# Patient Record
Sex: MALE | Race: WHITE | NOT HISPANIC OR LATINO
[De-identification: names, ages, dates, MRNs, and addresses within clinical notes are randomized per-mention and may not be internally consistent; named-entity substitution may affect disease eponyms.]

---

## 2020-02-04 ENCOUNTER — FORM ENCOUNTER (OUTPATIENT)
Age: 75
End: 2020-02-04

## 2020-02-05 ENCOUNTER — OUTPATIENT (OUTPATIENT)
Dept: OUTPATIENT SERVICES | Facility: HOSPITAL | Age: 75
LOS: 1 days | End: 2020-02-05
Payer: MEDICARE

## 2020-02-05 ENCOUNTER — APPOINTMENT (OUTPATIENT)
Dept: ORTHOPEDIC SURGERY | Facility: CLINIC | Age: 75
End: 2020-02-05
Payer: MEDICARE

## 2020-02-05 VITALS
BODY MASS INDEX: 35.79 KG/M2 | OXYGEN SATURATION: 97 % | HEART RATE: 92 BPM | WEIGHT: 250 LBS | HEIGHT: 70 IN | DIASTOLIC BLOOD PRESSURE: 80 MMHG | SYSTOLIC BLOOD PRESSURE: 180 MMHG

## 2020-02-05 PROBLEM — Z00.00 ENCOUNTER FOR PREVENTIVE HEALTH EXAMINATION: Status: ACTIVE | Noted: 2020-02-05

## 2020-02-05 PROCEDURE — 99203 OFFICE O/P NEW LOW 30 MIN: CPT

## 2020-02-05 PROCEDURE — 73562 X-RAY EXAM OF KNEE 3: CPT

## 2020-02-05 PROCEDURE — 73562 X-RAY EXAM OF KNEE 3: CPT | Mod: 26,50

## 2020-02-07 NOTE — ASSESSMENT
[FreeTextEntry1] : Assessment/Plan\par \par Bilateral knee OA\par \par The left knee was injected as described above, today they will rest ice and use Tylenol as needed for pain. \par \par Will demonstrate at home strengthening exercises\par \par Will prescribe Meloxicam \par \par F/U  in 3 months\par \par All medical record entries made by the PA/Scribe/Fellow are at my, Dr. Bashir Shaw's direction and personally dictated by me on 02/05/2020]. I have reviewed the chart and agree that the record accurately reflects my personal performance of the history, physical exam, assessment, and plan. I have also personally directed reviewed, and agreed with the chart.\par \par \par \par

## 2020-02-07 NOTE — REVIEW OF SYSTEMS
[Joint Pain] : joint pain [Negative] : Heme/Lymph [Arthralgia] : no arthralgia [Joint Stiffness] : no joint stiffness [Joint Swelling] : no joint swelling

## 2020-02-07 NOTE — HISTORY OF PRESENT ILLNESS
[de-identified] : Guanaco is a 75 year old male here for initial visit of bilateral knee pain (R>L). He complains of sharp, intermittent pain, rated 9/10. Patient is unable to walk more than half a block without taking a break. Pain is better at rest and worse during ambulation. He had an operation in the left knee for a meniscus tear 15 years ago. Patient never had knee injections and is currently take Aleve for pain management.

## 2020-02-07 NOTE — PROCEDURE
[de-identified] : After obtaining verbal consent and under normal sterile conditions the left knee joint was injected with 4 cc of lidocaine and 1 cc of 40 mg per mL of Kenalog.\par \par \par

## 2020-02-07 NOTE — PHYSICAL EXAM
[de-identified] : General: Not in acute distress, dressed appropriately, sitting on examination table\par Skin: Warm and dry, normal turgor, no rashes\par Neurological: AOx3, Cranial nerves grossly in tact\par Psych: Mood and affect appropriate\par \par Right Knee: Alignment: Neutral Non-Tender: ROM: 0-120 5/5 Strength. DNVI. Ambulates with no assisted crutches. +2 petting edema.\par \par Left Knee: Alignment: Neutral Tender- Medial and Joint line tenderness: ROM: 0-120 5/5 Strength. DNVI. Ambulates with no assisted crutches. \par +2 petting edema.\par \par \par \par \par  [de-identified] : X-ray of the left knee shows moderate-severe OA\par X-ray of the right knee shows  moderate OA

## 2020-02-07 NOTE — END OF VISIT
[FreeTextEntry3] : By signing my name below, I, Brittny Grimes, attest that this documentation has been prepared under the direction and in the presence of Micah Guerra PA-C.\par \par